# Patient Record
Sex: FEMALE | Race: OTHER | HISPANIC OR LATINO | URBAN - METROPOLITAN AREA
[De-identification: names, ages, dates, MRNs, and addresses within clinical notes are randomized per-mention and may not be internally consistent; named-entity substitution may affect disease eponyms.]

---

## 2021-11-29 ENCOUNTER — INPATIENT (INPATIENT)
Facility: HOSPITAL | Age: 61
LOS: 2 days | Discharge: HOME | End: 2021-12-02
Attending: HOSPITALIST | Admitting: HOSPITALIST
Payer: MEDICAID

## 2021-11-29 VITALS
DIASTOLIC BLOOD PRESSURE: 64 MMHG | RESPIRATION RATE: 20 BRPM | HEART RATE: 115 BPM | TEMPERATURE: 100 F | SYSTOLIC BLOOD PRESSURE: 132 MMHG | OXYGEN SATURATION: 100 %

## 2021-11-29 PROCEDURE — 93010 ELECTROCARDIOGRAM REPORT: CPT

## 2021-11-29 PROCEDURE — 99285 EMERGENCY DEPT VISIT HI MDM: CPT | Mod: 25

## 2021-11-30 DIAGNOSIS — J18.9 PNEUMONIA, UNSPECIFIED ORGANISM: ICD-10-CM

## 2021-11-30 DIAGNOSIS — A41.9 SEPSIS, UNSPECIFIED ORGANISM: ICD-10-CM

## 2021-11-30 DIAGNOSIS — Z29.9 ENCOUNTER FOR PROPHYLACTIC MEASURES, UNSPECIFIED: ICD-10-CM

## 2021-11-30 DIAGNOSIS — I10 ESSENTIAL (PRIMARY) HYPERTENSION: ICD-10-CM

## 2021-11-30 DIAGNOSIS — E87.6 HYPOKALEMIA: ICD-10-CM

## 2021-11-30 DIAGNOSIS — N39.0 URINARY TRACT INFECTION, SITE NOT SPECIFIED: ICD-10-CM

## 2021-11-30 DIAGNOSIS — E78.5 HYPERLIPIDEMIA, UNSPECIFIED: ICD-10-CM

## 2021-11-30 DIAGNOSIS — K21.9 GASTRO-ESOPHAGEAL REFLUX DISEASE WITHOUT ESOPHAGITIS: ICD-10-CM

## 2021-11-30 LAB
ALBUMIN SERPL ELPH-MCNC: 4.1 G/DL — SIGNIFICANT CHANGE UP (ref 3.3–5)
ALP SERPL-CCNC: 97 U/L — SIGNIFICANT CHANGE UP (ref 40–120)
ALT FLD-CCNC: 12 U/L — SIGNIFICANT CHANGE UP (ref 4–33)
ANION GAP SERPL CALC-SCNC: 17 MMOL/L — HIGH (ref 7–14)
APPEARANCE UR: ABNORMAL
AST SERPL-CCNC: 17 U/L — SIGNIFICANT CHANGE UP (ref 4–32)
BASOPHILS # BLD AUTO: 0 K/UL — SIGNIFICANT CHANGE UP (ref 0–0.2)
BASOPHILS NFR BLD AUTO: 0 % — SIGNIFICANT CHANGE UP (ref 0–2)
BILIRUB SERPL-MCNC: 1.8 MG/DL — HIGH (ref 0.2–1.2)
BILIRUB UR-MCNC: ABNORMAL
BLOOD GAS VENOUS COMPREHENSIVE RESULT: SIGNIFICANT CHANGE UP
BUN SERPL-MCNC: 14 MG/DL — SIGNIFICANT CHANGE UP (ref 7–23)
CALCIUM SERPL-MCNC: 9.1 MG/DL — SIGNIFICANT CHANGE UP (ref 8.4–10.5)
CHLORIDE SERPL-SCNC: 96 MMOL/L — LOW (ref 98–107)
CO2 SERPL-SCNC: 24 MMOL/L — SIGNIFICANT CHANGE UP (ref 22–31)
COLOR SPEC: ABNORMAL
CREAT SERPL-MCNC: 0.88 MG/DL — SIGNIFICANT CHANGE UP (ref 0.5–1.3)
DIFF PNL FLD: ABNORMAL
EOSINOPHIL # BLD AUTO: 0 K/UL — SIGNIFICANT CHANGE UP (ref 0–0.5)
EOSINOPHIL NFR BLD AUTO: 0 % — SIGNIFICANT CHANGE UP (ref 0–6)
FLUAV AG NPH QL: SIGNIFICANT CHANGE UP
FLUBV AG NPH QL: SIGNIFICANT CHANGE UP
GLUCOSE SERPL-MCNC: 103 MG/DL — HIGH (ref 70–99)
GLUCOSE UR QL: ABNORMAL
HCT VFR BLD CALC: 38.9 % — SIGNIFICANT CHANGE UP (ref 34.5–45)
HGB BLD-MCNC: 12.5 G/DL — SIGNIFICANT CHANGE UP (ref 11.5–15.5)
IANC: 19.7 K/UL — HIGH (ref 1.5–8.5)
KETONES UR-MCNC: ABNORMAL
LEGIONELLA AG UR QL: NEGATIVE — SIGNIFICANT CHANGE UP
LEUKOCYTE ESTERASE UR-ACNC: ABNORMAL
LYMPHOCYTES # BLD AUTO: 0.39 K/UL — LOW (ref 1–3.3)
LYMPHOCYTES # BLD AUTO: 1.7 % — LOW (ref 13–44)
MAGNESIUM SERPL-MCNC: 1.6 MG/DL — SIGNIFICANT CHANGE UP (ref 1.6–2.6)
MCHC RBC-ENTMCNC: 27.6 PG — SIGNIFICANT CHANGE UP (ref 27–34)
MCHC RBC-ENTMCNC: 32.1 GM/DL — SIGNIFICANT CHANGE UP (ref 32–36)
MCV RBC AUTO: 85.9 FL — SIGNIFICANT CHANGE UP (ref 80–100)
MONOCYTES # BLD AUTO: 1.58 K/UL — HIGH (ref 0–0.9)
MONOCYTES NFR BLD AUTO: 6.9 % — SIGNIFICANT CHANGE UP (ref 2–14)
NEUTROPHILS # BLD AUTO: 20.77 K/UL — HIGH (ref 1.8–7.4)
NEUTROPHILS NFR BLD AUTO: 87 % — HIGH (ref 43–77)
NITRITE UR-MCNC: NEGATIVE — SIGNIFICANT CHANGE UP
PH UR: 6 — SIGNIFICANT CHANGE UP (ref 5–8)
PHOSPHATE SERPL-MCNC: 2.3 MG/DL — LOW (ref 2.5–4.5)
PLATELET # BLD AUTO: 297 K/UL — SIGNIFICANT CHANGE UP (ref 150–400)
POTASSIUM SERPL-MCNC: 2.8 MMOL/L — CRITICAL LOW (ref 3.5–5.3)
POTASSIUM SERPL-SCNC: 2.8 MMOL/L — CRITICAL LOW (ref 3.5–5.3)
PROCALCITONIN SERPL-MCNC: 1.19 NG/ML — HIGH (ref 0.02–0.1)
PROT SERPL-MCNC: 7.7 G/DL — SIGNIFICANT CHANGE UP (ref 6–8.3)
PROT UR-MCNC: ABNORMAL
RBC # BLD: 4.53 M/UL — SIGNIFICANT CHANGE UP (ref 3.8–5.2)
RBC # FLD: 14 % — SIGNIFICANT CHANGE UP (ref 10.3–14.5)
RSV RNA NPH QL NAA+NON-PROBE: SIGNIFICANT CHANGE UP
SARS-COV-2 RNA SPEC QL NAA+PROBE: SIGNIFICANT CHANGE UP
SODIUM SERPL-SCNC: 137 MMOL/L — SIGNIFICANT CHANGE UP (ref 135–145)
SP GR SPEC: 1.03 — SIGNIFICANT CHANGE UP (ref 1–1.05)
TROPONIN T, HIGH SENSITIVITY RESULT: 17 NG/L — SIGNIFICANT CHANGE UP
UROBILINOGEN FLD QL: ABNORMAL
WBC # BLD: 22.95 K/UL — HIGH (ref 3.8–10.5)
WBC # FLD AUTO: 22.95 K/UL — HIGH (ref 3.8–10.5)

## 2021-11-30 PROCEDURE — 71045 X-RAY EXAM CHEST 1 VIEW: CPT | Mod: 26,59

## 2021-11-30 PROCEDURE — 99223 1ST HOSP IP/OBS HIGH 75: CPT

## 2021-11-30 PROCEDURE — 71046 X-RAY EXAM CHEST 2 VIEWS: CPT | Mod: 26

## 2021-11-30 RX ORDER — ENOXAPARIN SODIUM 100 MG/ML
40 INJECTION SUBCUTANEOUS EVERY 24 HOURS
Refills: 0 | Status: DISCONTINUED | OUTPATIENT
Start: 2021-11-30 | End: 2021-12-02

## 2021-11-30 RX ORDER — SODIUM CHLORIDE 9 MG/ML
2000 INJECTION INTRAMUSCULAR; INTRAVENOUS; SUBCUTANEOUS ONCE
Refills: 0 | Status: COMPLETED | OUTPATIENT
Start: 2021-11-30 | End: 2021-11-30

## 2021-11-30 RX ORDER — MAGNESIUM SULFATE 500 MG/ML
2 VIAL (ML) INJECTION ONCE
Refills: 0 | Status: COMPLETED | OUTPATIENT
Start: 2021-11-30 | End: 2021-11-30

## 2021-11-30 RX ORDER — CEFTRIAXONE 500 MG/1
1000 INJECTION, POWDER, FOR SOLUTION INTRAMUSCULAR; INTRAVENOUS EVERY 24 HOURS
Refills: 0 | Status: DISCONTINUED | OUTPATIENT
Start: 2021-12-01 | End: 2021-12-02

## 2021-11-30 RX ORDER — AMLODIPINE BESYLATE 2.5 MG/1
10 TABLET ORAL DAILY
Refills: 0 | Status: DISCONTINUED | OUTPATIENT
Start: 2021-11-30 | End: 2021-12-02

## 2021-11-30 RX ORDER — AMLODIPINE BESYLATE 2.5 MG/1
1 TABLET ORAL
Qty: 0 | Refills: 0 | DISCHARGE

## 2021-11-30 RX ORDER — CEFTRIAXONE 500 MG/1
1000 INJECTION, POWDER, FOR SOLUTION INTRAMUSCULAR; INTRAVENOUS ONCE
Refills: 0 | Status: COMPLETED | OUTPATIENT
Start: 2021-11-30 | End: 2021-11-30

## 2021-11-30 RX ORDER — AZITHROMYCIN 500 MG/1
500 TABLET, FILM COATED ORAL EVERY 24 HOURS
Refills: 0 | Status: DISCONTINUED | OUTPATIENT
Start: 2021-12-01 | End: 2021-12-02

## 2021-11-30 RX ORDER — AZITHROMYCIN 500 MG/1
500 TABLET, FILM COATED ORAL ONCE
Refills: 0 | Status: COMPLETED | OUTPATIENT
Start: 2021-11-30 | End: 2021-11-30

## 2021-11-30 RX ORDER — SODIUM CHLORIDE 9 MG/ML
1000 INJECTION INTRAMUSCULAR; INTRAVENOUS; SUBCUTANEOUS ONCE
Refills: 0 | Status: COMPLETED | OUTPATIENT
Start: 2021-11-30 | End: 2021-11-30

## 2021-11-30 RX ORDER — POTASSIUM CHLORIDE 20 MEQ
40 PACKET (EA) ORAL ONCE
Refills: 0 | Status: COMPLETED | OUTPATIENT
Start: 2021-11-30 | End: 2021-11-30

## 2021-11-30 RX ORDER — ATORVASTATIN CALCIUM 80 MG/1
1 TABLET, FILM COATED ORAL
Qty: 0 | Refills: 0 | DISCHARGE

## 2021-11-30 RX ORDER — ASPIRIN/CALCIUM CARB/MAGNESIUM 324 MG
1 TABLET ORAL
Qty: 0 | Refills: 0 | DISCHARGE

## 2021-11-30 RX ORDER — PANTOPRAZOLE SODIUM 20 MG/1
40 TABLET, DELAYED RELEASE ORAL
Refills: 0 | Status: DISCONTINUED | OUTPATIENT
Start: 2021-11-30 | End: 2021-12-02

## 2021-11-30 RX ORDER — POTASSIUM CHLORIDE 20 MEQ
10 PACKET (EA) ORAL
Refills: 0 | Status: COMPLETED | OUTPATIENT
Start: 2021-11-30 | End: 2021-11-30

## 2021-11-30 RX ORDER — ACETAMINOPHEN 500 MG
650 TABLET ORAL EVERY 6 HOURS
Refills: 0 | Status: DISCONTINUED | OUTPATIENT
Start: 2021-11-30 | End: 2021-12-02

## 2021-11-30 RX ORDER — ESOMEPRAZOLE MAGNESIUM 40 MG/1
1 CAPSULE, DELAYED RELEASE ORAL
Qty: 0 | Refills: 0 | DISCHARGE

## 2021-11-30 RX ORDER — IPRATROPIUM/ALBUTEROL SULFATE 18-103MCG
3 AEROSOL WITH ADAPTER (GRAM) INHALATION EVERY 6 HOURS
Refills: 0 | Status: DISCONTINUED | OUTPATIENT
Start: 2021-11-30 | End: 2021-12-02

## 2021-11-30 RX ORDER — ATORVASTATIN CALCIUM 80 MG/1
40 TABLET, FILM COATED ORAL AT BEDTIME
Refills: 0 | Status: DISCONTINUED | OUTPATIENT
Start: 2021-11-30 | End: 2021-12-02

## 2021-11-30 RX ORDER — ACETAMINOPHEN 500 MG
975 TABLET ORAL ONCE
Refills: 0 | Status: COMPLETED | OUTPATIENT
Start: 2021-11-30 | End: 2021-11-30

## 2021-11-30 RX ORDER — ASPIRIN/CALCIUM CARB/MAGNESIUM 324 MG
81 TABLET ORAL DAILY
Refills: 0 | Status: DISCONTINUED | OUTPATIENT
Start: 2021-11-30 | End: 2021-12-02

## 2021-11-30 RX ADMIN — Medication 100 MILLIEQUIVALENT(S): at 06:35

## 2021-11-30 RX ADMIN — Medication 50 GRAM(S): at 06:35

## 2021-11-30 RX ADMIN — ENOXAPARIN SODIUM 40 MILLIGRAM(S): 100 INJECTION SUBCUTANEOUS at 21:17

## 2021-11-30 RX ADMIN — SODIUM CHLORIDE 1000 MILLILITER(S): 9 INJECTION INTRAMUSCULAR; INTRAVENOUS; SUBCUTANEOUS at 05:22

## 2021-11-30 RX ADMIN — SODIUM CHLORIDE 2000 MILLILITER(S): 9 INJECTION INTRAMUSCULAR; INTRAVENOUS; SUBCUTANEOUS at 04:41

## 2021-11-30 RX ADMIN — Medication 975 MILLIGRAM(S): at 02:56

## 2021-11-30 RX ADMIN — Medication 100 MILLIEQUIVALENT(S): at 08:28

## 2021-11-30 RX ADMIN — SODIUM CHLORIDE 2000 MILLILITER(S): 9 INJECTION INTRAMUSCULAR; INTRAVENOUS; SUBCUTANEOUS at 02:55

## 2021-11-30 RX ADMIN — ATORVASTATIN CALCIUM 40 MILLIGRAM(S): 80 TABLET, FILM COATED ORAL at 21:17

## 2021-11-30 RX ADMIN — Medication 975 MILLIGRAM(S): at 05:30

## 2021-11-30 RX ADMIN — Medication 3 MILLILITER(S): at 22:57

## 2021-11-30 RX ADMIN — CEFTRIAXONE 100 MILLIGRAM(S): 500 INJECTION, POWDER, FOR SOLUTION INTRAMUSCULAR; INTRAVENOUS at 04:41

## 2021-11-30 RX ADMIN — Medication 63.75 MILLIMOLE(S): at 08:11

## 2021-11-30 RX ADMIN — Medication 81 MILLIGRAM(S): at 14:48

## 2021-11-30 RX ADMIN — AZITHROMYCIN 500 MILLIGRAM(S): 500 TABLET, FILM COATED ORAL at 06:35

## 2021-11-30 RX ADMIN — Medication 100 MILLIEQUIVALENT(S): at 05:23

## 2021-11-30 RX ADMIN — AZITHROMYCIN 255 MILLIGRAM(S): 500 TABLET, FILM COATED ORAL at 04:56

## 2021-11-30 RX ADMIN — CEFTRIAXONE 1000 MILLIGRAM(S): 500 INJECTION, POWDER, FOR SOLUTION INTRAMUSCULAR; INTRAVENOUS at 05:23

## 2021-11-30 RX ADMIN — Medication 40 MILLIEQUIVALENT(S): at 04:43

## 2021-11-30 NOTE — ED PROVIDER NOTE - NSICDXPASTMEDICALHX_GEN_ALL_CORE_FT
PAST MEDICAL HISTORY:  GERD (gastroesophageal reflux disease)     Hyperlipidemia     Hypertension

## 2021-11-30 NOTE — H&P ADULT - PROBLEM SELECTOR PLAN 2
Cough, fever. Respiratory status stable. Crackles diffusely on lung exam. CXR with LLL PNA.   - C/w ceftriaxone and azithromycin to treat for CAP

## 2021-11-30 NOTE — ED PROVIDER NOTE - OBJECTIVE STATEMENT
62 y/o F with h/o HTN, hyperlipidemia, GERD here with 1 day fever, myalgias, cough.  Pt reports she was recently visiting her family in [place] and her granddaughter has similar symptoms.  No cp, sob, abd pain.  (+)nausea, (+)"loose stool"  Pt is vaccinated against covid-19 (moderna, last dose 9/30/21); has not received flu vaccine this year. 62 y/o F with h/o HTN, hyperlipidemia, GERD here with 1 day fever, myalgias, cough.  Pt reports she was recently visiting her family in [place] and her granddaughter has similar symptoms.  No cp, sob, abd pain.  (+)nausea, (+)"loose stool"  Pt is vaccinated against covid-19 (moderna, last dose 9/30/21); has not received flu vaccine this year.    PMD Dr Dewayne Navarro

## 2021-11-30 NOTE — H&P ADULT - NSHPPHYSICALEXAM_GEN_ALL_CORE
.  VITAL SIGNS:  T(C): 36.6 (11-30-21 @ 08:36), Max: 39.1 (11-30-21 @ 01:38)  T(F): 97.8 (11-30-21 @ 08:36), Max: 102.4 (11-30-21 @ 01:38)  HR: 82 (11-30-21 @ 08:36) (82 - 115)  BP: 132/51 (11-30-21 @ 08:36) (116/49 - 132/64)  BP(mean): --  RR: 20 (11-30-21 @ 08:36) (17 - 21)  SpO2: 95% (11-30-21 @ 08:36) (95% - 100%)  Wt(kg): --    PHYSICAL EXAM:    Constitutional: WDWN resting comfortably in bed; NAD  Head: NC/AT  Eyes: PERRL, EOMI, anicteric sclera  ENT: no nasal discharge; uvula midline, no oropharyngeal erythema or exudates; MMM  Neck: supple; no JVD or thyromegaly  Respiratory: Diffuse crackles throughout all lung fields, no wheezing/rhonchi. No respiratory distress, not tachypneic.   Cardiac: +S1/S2; RRR; no M/R/G; PMI non-displaced  Gastrointestinal: abdomen soft, NT/ND; no rebound or guarding; +BSx4  Back: spine midline, no bony tenderness or step-offs; Left sided CVA tenderness  Extremities: WWP, no clubbing or cyanosis; no peripheral edema  Musculoskeletal: NROM x4; no joint swelling, tenderness or erythema  Vascular: 2+ radial, femoral, DP/PT pulses B/L  Dermatologic: skin warm, dry and intact; no rashes, wounds, or scars  Lymphatic: no submandibular or cervical LAD  Neurologic: AAOx3; CNII-XII grossly intact; no focal deficits  Psychiatric: affect and characteristics of appearance, verbalizations, behaviors are appropriate

## 2021-11-30 NOTE — ED ADULT NURSE REASSESSMENT NOTE - NS ED NURSE REASSESS COMMENT FT1
pt at baseline mental status, appears in NAD. Denies any complaints. RR even and unlabored. Report given to RN Heydi on receiving floor. Awaiting transport. Will continue to monitor.

## 2021-11-30 NOTE — H&P ADULT - NSHPLABSRESULTS_GEN_ALL_CORE
.  LABS:                         12.5   22.95 )-----------( 297      ( 2021 03:39 )             38.9         137  |  96<L>  |  14  ----------------------------<  103<H>  2.8<LL>   |  24  |  0.88    Ca    9.1      2021 03:39  Phos  2.3       Mg     1.60         TPro  7.7  /  Alb  4.1  /  TBili  1.8<H>  /  DBili  x   /  AST  17  /  ALT  12  /  AlkPhos  97        Urinalysis Basic - ( 2021 03:39 )    Color: Nannette / Appearance: Slightly Turbid / S.029 / pH: x  Gluc: x / Ketone: Small  / Bili: Small / Urobili: 6 mg/dL   Blood: x / Protein: 100 mg/dL / Nitrite: Negative   Leuk Esterase: Moderate / RBC: <5 /HPF / WBC >10 /HPF   Sq Epi: x / Non Sq Epi: 12 /HPF / Bacteria: Few                RADIOLOGY, EKG & ADDITIONAL TESTS: Reviewed.

## 2021-11-30 NOTE — H&P ADULT - HISTORY OF PRESENT ILLNESS
60 y/o F with PMH HTN, HLD, and GERD who presents after having a fever, cough and back pain at home. She reports that her daughter was sick and she started to feel unwell after Thanksgiving. She was experiencing fever, cough, SOB, chest pain with coughing, nausea/vomiting (5 episodes), and one episode of watery diarrhea. She reports that she has been urinating more frequently and has some pain in her lower abdomen as well as mild back pain. No dysuria. She reports her urine is malodorous. She is now feeling better after coming to the emergency room.     In the ED, VS T 102.4, RR 21, SpO2 95% on RA, HR 92. Labs significant for leukocytosis. UA positive. CXR with LLL PNA. She was given ceftriaxone and azithromycin. Admitted to medicine for further management.

## 2021-11-30 NOTE — H&P ADULT - ASSESSMENT
60 y/o F with PMH HTN, HLD, and GERD who presents after having a fever, cough and back pain at home found to be septic 2/2 UTI and PNA.

## 2021-11-30 NOTE — H&P ADULT - NSHPREVIEWOFSYSTEMS_GEN_ALL_CORE
REVIEW OF SYSTEMS:    CONSTITUTIONAL: (+) weakness, fevers or chills  EYES/ENT: No visual changes;  No vertigo or throat pain   NECK: No pain or stiffness  RESPIRATORY: (+) cough - no wheezing, hemoptysis; No shortness of breath  CARDIOVASCULAR: No chest pain or palpitations  GASTROINTESTINAL: (+) abdominal pain. (+) nausea/vomiting. No hematemesis.; (+) diarrhea, no constipation.   GENITOURINARY: No dysuria, frequency or hematuria  NEUROLOGICAL: No numbness or weakness  SKIN: No itching, rashes

## 2021-11-30 NOTE — H&P ADULT - PROBLEM SELECTOR PLAN 3
Likely pyelonephritis given presentation of nausea/vomiting, increased urinary frequency and back pain  as well as physical exam findings (CVA tenderness/suprapubic tenderness).   - C/w ceftriaxone for now   - F/u UCx   - If no improvement in fever/symptoms - would obtain CT scan to assess for source control.

## 2021-11-30 NOTE — ED PROVIDER NOTE - CLINICAL SUMMARY MEDICAL DECISION MAKING FREE TEXT BOX
62 y/o F with h/o hyperlipdemia, HTN, GERD with 1 day fever, myalgias, cough.  Pt is febrile and tachycardic here, while meets SIRS criteria for sepsis will send cultures, labs, cxr, ua, hold abx at this time given high suspicion for viral etiology.  IVFs, antipyretics, reassess.

## 2021-11-30 NOTE — ED ADULT NURSE REASSESSMENT NOTE - NS ED NURSE REASSESS COMMENT FT1
received report from overnight RN. Pt axox4, resting with RR even and unlabored. No accessory muscle use noted. VSS. Pt receiving K runs as per MD orders. NSR on monitor. Pt denies CP, palpitations. pt verbalizing improvement in symptoms since arriving to ED. Awaiting bed assignment. Will continue to monitor.

## 2021-11-30 NOTE — H&P ADULT - NSHPSOCIALHISTORY_GEN_ALL_CORE
General Sunscreen Counseling: I recommended a broad spectrum sunscreen with a SPF of 45 or higher.  I explained that SPF 45 sunscreens block approximately 97 percent of the sun's harmful rays.  Sunscreens should be applied at least 15 minutes prior to expected sun exposure and then every 2 hours after that as long as sun exposure continues. If swimming or exercising sunscreen should be reapplied every 45 minutes to an hour after getting wet or sweating.  One ounce, or the equivalent of a shot glass full of sunscreen, is adequate to protect the skin not covered by a bathing suit. I also recommended a lip balm with a sunscreen as well. Sun protective clothing can be used in lieu of sunscreen but must be worn the entire time you are exposed to the sun's rays. Detail Level: Detailed Denies EtOH, tobacco use

## 2021-11-30 NOTE — ED ADULT NURSE NOTE - OBJECTIVE STATEMENT
patient aaox4. ambulatory. came in with flu like symptoms. patient reports symptoms started about a week ago. went to see family in [place] and felt worse afterwards. granddaughter was sick with unkown illness, flu-like symptoms. patient reports nasal congestions, subjective fevers and chills at home, shortness of breath, n/v x5 episodes today, dizziness, and headache 10/10. patient is covid vaccinated. did not receive booster shot or flu vaccine. iv start to left and right ac #18g. labs drawn and sent. medicated as ordered. blood cultures sent. flu and covid swab sent. made comfortable. Will continue to monitor

## 2021-11-30 NOTE — H&P ADULT - PROBLEM SELECTOR PLAN 1
T 102.4 with leukocytosis of 22 2/2 pyelonephritis and LLL PNA. UA positive and pt reporting flank pain, CVA tenderness (+) on exam. LLL consolidation detected. COVID negative. S/p 3L NS + cef/azithro in the ED.   - C/w ceftriaxone and azithromycin for now   - Appropriately fluid resuscitated, encourage PO intale   - F/u BCx and UCx   - F/u urine legionella   - Tylenol PRN fever

## 2021-12-01 LAB
ANION GAP SERPL CALC-SCNC: 11 MMOL/L — SIGNIFICANT CHANGE UP (ref 7–14)
ANION GAP SERPL CALC-SCNC: 11 MMOL/L — SIGNIFICANT CHANGE UP (ref 7–14)
BUN SERPL-MCNC: 10 MG/DL — SIGNIFICANT CHANGE UP (ref 7–23)
BUN SERPL-MCNC: 12 MG/DL — SIGNIFICANT CHANGE UP (ref 7–23)
CALCIUM SERPL-MCNC: 8.5 MG/DL — SIGNIFICANT CHANGE UP (ref 8.4–10.5)
CALCIUM SERPL-MCNC: 8.6 MG/DL — SIGNIFICANT CHANGE UP (ref 8.4–10.5)
CHLORIDE SERPL-SCNC: 104 MMOL/L — SIGNIFICANT CHANGE UP (ref 98–107)
CHLORIDE SERPL-SCNC: 105 MMOL/L — SIGNIFICANT CHANGE UP (ref 98–107)
CO2 SERPL-SCNC: 22 MMOL/L — SIGNIFICANT CHANGE UP (ref 22–31)
CO2 SERPL-SCNC: 24 MMOL/L — SIGNIFICANT CHANGE UP (ref 22–31)
CREAT SERPL-MCNC: 0.64 MG/DL — SIGNIFICANT CHANGE UP (ref 0.5–1.3)
CREAT SERPL-MCNC: 0.78 MG/DL — SIGNIFICANT CHANGE UP (ref 0.5–1.3)
CULTURE RESULTS: SIGNIFICANT CHANGE UP
GLUCOSE SERPL-MCNC: 102 MG/DL — HIGH (ref 70–99)
GLUCOSE SERPL-MCNC: 113 MG/DL — HIGH (ref 70–99)
HCT VFR BLD CALC: 35.5 % — SIGNIFICANT CHANGE UP (ref 34.5–45)
HCV AB S/CO SERPL IA: 0.17 S/CO — SIGNIFICANT CHANGE UP (ref 0–0.99)
HCV AB SERPL-IMP: SIGNIFICANT CHANGE UP
HGB BLD-MCNC: 11.7 G/DL — SIGNIFICANT CHANGE UP (ref 11.5–15.5)
MAGNESIUM SERPL-MCNC: 2.1 MG/DL — SIGNIFICANT CHANGE UP (ref 1.6–2.6)
MAGNESIUM SERPL-MCNC: 2.1 MG/DL — SIGNIFICANT CHANGE UP (ref 1.6–2.6)
MCHC RBC-ENTMCNC: 28.5 PG — SIGNIFICANT CHANGE UP (ref 27–34)
MCHC RBC-ENTMCNC: 33 GM/DL — SIGNIFICANT CHANGE UP (ref 32–36)
MCV RBC AUTO: 86.4 FL — SIGNIFICANT CHANGE UP (ref 80–100)
NRBC # BLD: 0 /100 WBCS — SIGNIFICANT CHANGE UP
NRBC # FLD: 0 K/UL — SIGNIFICANT CHANGE UP
PHOSPHATE SERPL-MCNC: 1.6 MG/DL — LOW (ref 2.5–4.5)
PHOSPHATE SERPL-MCNC: 2.5 MG/DL — SIGNIFICANT CHANGE UP (ref 2.5–4.5)
PLATELET # BLD AUTO: 304 K/UL — SIGNIFICANT CHANGE UP (ref 150–400)
POTASSIUM SERPL-MCNC: 3.1 MMOL/L — LOW (ref 3.5–5.3)
POTASSIUM SERPL-MCNC: 3.6 MMOL/L — SIGNIFICANT CHANGE UP (ref 3.5–5.3)
POTASSIUM SERPL-SCNC: 3.1 MMOL/L — LOW (ref 3.5–5.3)
POTASSIUM SERPL-SCNC: 3.6 MMOL/L — SIGNIFICANT CHANGE UP (ref 3.5–5.3)
RBC # BLD: 4.11 M/UL — SIGNIFICANT CHANGE UP (ref 3.8–5.2)
RBC # FLD: 14.3 % — SIGNIFICANT CHANGE UP (ref 10.3–14.5)
SODIUM SERPL-SCNC: 137 MMOL/L — SIGNIFICANT CHANGE UP (ref 135–145)
SODIUM SERPL-SCNC: 140 MMOL/L — SIGNIFICANT CHANGE UP (ref 135–145)
SPECIMEN SOURCE: SIGNIFICANT CHANGE UP
WBC # BLD: 20.71 K/UL — HIGH (ref 3.8–10.5)
WBC # FLD AUTO: 20.71 K/UL — HIGH (ref 3.8–10.5)

## 2021-12-01 PROCEDURE — 99233 SBSQ HOSP IP/OBS HIGH 50: CPT

## 2021-12-01 RX ORDER — POTASSIUM CHLORIDE 20 MEQ
40 PACKET (EA) ORAL ONCE
Refills: 0 | Status: COMPLETED | OUTPATIENT
Start: 2021-12-01 | End: 2021-12-01

## 2021-12-01 RX ORDER — POTASSIUM PHOSPHATE, MONOBASIC POTASSIUM PHOSPHATE, DIBASIC 236; 224 MG/ML; MG/ML
30 INJECTION, SOLUTION INTRAVENOUS ONCE
Refills: 0 | Status: COMPLETED | OUTPATIENT
Start: 2021-12-01 | End: 2021-12-01

## 2021-12-01 RX ORDER — INFLUENZA VIRUS VACCINE 15; 15; 15; 15 UG/.5ML; UG/.5ML; UG/.5ML; UG/.5ML
0.5 SUSPENSION INTRAMUSCULAR ONCE
Refills: 0 | Status: DISCONTINUED | OUTPATIENT
Start: 2021-12-01 | End: 2021-12-02

## 2021-12-01 RX ADMIN — Medication 3 MILLILITER(S): at 04:41

## 2021-12-01 RX ADMIN — AZITHROMYCIN 255 MILLIGRAM(S): 500 TABLET, FILM COATED ORAL at 05:33

## 2021-12-01 RX ADMIN — CEFTRIAXONE 100 MILLIGRAM(S): 500 INJECTION, POWDER, FOR SOLUTION INTRAMUSCULAR; INTRAVENOUS at 04:27

## 2021-12-01 RX ADMIN — Medication 3 MILLILITER(S): at 16:00

## 2021-12-01 RX ADMIN — PANTOPRAZOLE SODIUM 40 MILLIGRAM(S): 20 TABLET, DELAYED RELEASE ORAL at 06:39

## 2021-12-01 RX ADMIN — ENOXAPARIN SODIUM 40 MILLIGRAM(S): 100 INJECTION SUBCUTANEOUS at 21:36

## 2021-12-01 RX ADMIN — POTASSIUM PHOSPHATE, MONOBASIC POTASSIUM PHOSPHATE, DIBASIC 83.33 MILLIMOLE(S): 236; 224 INJECTION, SOLUTION INTRAVENOUS at 02:22

## 2021-12-01 RX ADMIN — Medication 81 MILLIGRAM(S): at 11:03

## 2021-12-01 RX ADMIN — Medication 40 MILLIEQUIVALENT(S): at 01:59

## 2021-12-01 RX ADMIN — AMLODIPINE BESYLATE 10 MILLIGRAM(S): 2.5 TABLET ORAL at 05:34

## 2021-12-01 RX ADMIN — ATORVASTATIN CALCIUM 40 MILLIGRAM(S): 80 TABLET, FILM COATED ORAL at 21:35

## 2021-12-01 RX ADMIN — Medication 3 MILLILITER(S): at 23:56

## 2021-12-01 RX ADMIN — Medication 3 MILLILITER(S): at 09:19

## 2021-12-01 NOTE — PATIENT PROFILE ADULT - FALL HARM RISK - UNIVERSAL INTERVENTIONS
Bed in lowest position, wheels locked, appropriate side rails in place/Call bell, personal items and telephone in reach/Instruct patient to call for assistance before getting out of bed or chair/Non-slip footwear when patient is out of bed/[place] to call system/Physically safe environment - no spills, clutter or unnecessary equipment/Purposeful Proactive Rounding/Room/bathroom lighting operational, light cord in reach

## 2021-12-01 NOTE — PATIENT PROFILE ADULT - NSPROHMSYMPCOND_GEN_A_NUR
CM called pt's pharmacy to check coverage of lovenox. Per pharmacy tech, prescription is covered with a $40 copay. Stated has partial in stock today and will have rest in stock tomorrow.        [facility] - [place], [place] - [address]  [address] [address]  Phone: [phone] Fax: [phone]       none

## 2021-12-01 NOTE — PATIENT PROFILE ADULT - FALL HARM RISK - FALLEN IN PAST
----- Message from Thomas Morrisonma sent at 6/26/2017  7:17 AM CDT -----  Hemoglobin A1c in diabetic range; please refer patient to diabetes clinic, Vasyl Brar or Dr Corina Costello.  Kidney function normal. One liver enzyme elevated; pt has referral to see No

## 2021-12-02 ENCOUNTER — TRANSCRIPTION ENCOUNTER (OUTPATIENT)
Age: 61
End: 2021-12-02

## 2021-12-02 VITALS — OXYGEN SATURATION: 99 %

## 2021-12-02 LAB
ANION GAP SERPL CALC-SCNC: 11 MMOL/L — SIGNIFICANT CHANGE UP (ref 7–14)
BUN SERPL-MCNC: 8 MG/DL — SIGNIFICANT CHANGE UP (ref 7–23)
CALCIUM SERPL-MCNC: 8.6 MG/DL — SIGNIFICANT CHANGE UP (ref 8.4–10.5)
CHLORIDE SERPL-SCNC: 99 MMOL/L — SIGNIFICANT CHANGE UP (ref 98–107)
CO2 SERPL-SCNC: 24 MMOL/L — SIGNIFICANT CHANGE UP (ref 22–31)
CREAT SERPL-MCNC: 0.6 MG/DL — SIGNIFICANT CHANGE UP (ref 0.5–1.3)
GLUCOSE SERPL-MCNC: 138 MG/DL — HIGH (ref 70–99)
HCT VFR BLD CALC: 34.7 % — SIGNIFICANT CHANGE UP (ref 34.5–45)
HGB BLD-MCNC: 11.3 G/DL — LOW (ref 11.5–15.5)
MAGNESIUM SERPL-MCNC: 2 MG/DL — SIGNIFICANT CHANGE UP (ref 1.6–2.6)
MCHC RBC-ENTMCNC: 28 PG — SIGNIFICANT CHANGE UP (ref 27–34)
MCHC RBC-ENTMCNC: 32.6 GM/DL — SIGNIFICANT CHANGE UP (ref 32–36)
MCV RBC AUTO: 85.9 FL — SIGNIFICANT CHANGE UP (ref 80–100)
NRBC # BLD: 0 /100 WBCS — SIGNIFICANT CHANGE UP
NRBC # FLD: 0 K/UL — SIGNIFICANT CHANGE UP
PHOSPHATE SERPL-MCNC: 2.5 MG/DL — SIGNIFICANT CHANGE UP (ref 2.5–4.5)
PLATELET # BLD AUTO: 309 K/UL — SIGNIFICANT CHANGE UP (ref 150–400)
POTASSIUM SERPL-MCNC: 3.2 MMOL/L — LOW (ref 3.5–5.3)
POTASSIUM SERPL-SCNC: 3.2 MMOL/L — LOW (ref 3.5–5.3)
RBC # BLD: 4.04 M/UL — SIGNIFICANT CHANGE UP (ref 3.8–5.2)
RBC # FLD: 14.3 % — SIGNIFICANT CHANGE UP (ref 10.3–14.5)
SODIUM SERPL-SCNC: 134 MMOL/L — LOW (ref 135–145)
WBC # BLD: 15.55 K/UL — HIGH (ref 3.8–10.5)
WBC # FLD AUTO: 15.55 K/UL — HIGH (ref 3.8–10.5)

## 2021-12-02 PROCEDURE — 99239 HOSP IP/OBS DSCHRG MGMT >30: CPT

## 2021-12-02 RX ORDER — ACETAMINOPHEN 500 MG
2 TABLET ORAL
Qty: 0 | Refills: 0 | DISCHARGE
Start: 2021-12-02

## 2021-12-02 RX ORDER — CIPROFLOXACIN LACTATE 400MG/40ML
1 VIAL (ML) INTRAVENOUS
Qty: 5 | Refills: 0
Start: 2021-12-02 | End: 2021-12-06

## 2021-12-02 RX ADMIN — PANTOPRAZOLE SODIUM 40 MILLIGRAM(S): 20 TABLET, DELAYED RELEASE ORAL at 06:59

## 2021-12-02 RX ADMIN — AZITHROMYCIN 255 MILLIGRAM(S): 500 TABLET, FILM COATED ORAL at 05:40

## 2021-12-02 RX ADMIN — CEFTRIAXONE 100 MILLIGRAM(S): 500 INJECTION, POWDER, FOR SOLUTION INTRAMUSCULAR; INTRAVENOUS at 04:47

## 2021-12-02 RX ADMIN — Medication 3 MILLILITER(S): at 15:09

## 2021-12-02 RX ADMIN — Medication 650 MILLIGRAM(S): at 06:22

## 2021-12-02 RX ADMIN — Medication 3 MILLILITER(S): at 10:52

## 2021-12-02 RX ADMIN — Medication 81 MILLIGRAM(S): at 12:26

## 2021-12-02 RX ADMIN — AMLODIPINE BESYLATE 10 MILLIGRAM(S): 2.5 TABLET ORAL at 05:40

## 2021-12-02 RX ADMIN — Medication 650 MILLIGRAM(S): at 05:46

## 2021-12-02 NOTE — PROGRESS NOTE ADULT - PROBLEM SELECTOR PLAN 3
Likely pyelonephritis given presentation of nausea/vomiting, increased urinary frequency and back pain  as well as physical exam findings (CVA tenderness/suprapubic tenderness).   - C/w ceftriaxone for now   - F/u UCx, blood Cxs
UCx negative, symptoms resolved   will d/c on Levaquin to cover CAP

## 2021-12-02 NOTE — DISCHARGE NOTE NURSING/CASE MANAGEMENT/SOCIAL WORK - PATIENT PORTAL LINK FT
You can access the FollowMyHealth Patient Portal offered by [facility] by registering at the following website: [email or web address]. By joining [facility]’s FollowMyHealth portal, you will also be able to view your health information using other applications (apps) compatible with our system.

## 2021-12-02 NOTE — DISCHARGE NOTE PROVIDER - NSDCCPCAREPLAN_GEN_ALL_CORE_FT
PRINCIPAL DISCHARGE DIAGNOSIS  Diagnosis: Pneumonia  Assessment and Plan of Treatment: Lower lobe Pneumonia.  Continue Levaquin and complete antibioitcs for a total of 7 days.  Follow up with your PCP within 1 week of discharge for further medical management.         SECONDARY DISCHARGE DIAGNOSES  Diagnosis: Hypokalemia  Assessment and Plan of Treatment: Your potassium level has norm alized since admission.    Diagnosis: HTN (hypertension)  Assessment and Plan of Treatment: Continue current blood pressure medication regimen as directed. Monitor for any visual changes, headaches or dizziness.  Monitor blood pressure regularly.  Follow up with your PCP for further management for high blood pressure, please call to make appointment within 1 week of discharge    Diagnosis: HLD (hyperlipidemia)  Assessment and Plan of Treatment: Continue medication as prescribed.    Diagnosis: GERD (gastroesophageal reflux disease)  Assessment and Plan of Treatment: Continue Nexium as prescribed.

## 2021-12-02 NOTE — DISCHARGE NOTE NURSING/CASE MANAGEMENT/SOCIAL WORK - NSDCPEFALRISK_GEN_ALL_CORE
For information on Fall & Injury Prevention, visit: [email or web address] OR  [email or web address] OR  https://www.cdc.gov/steadi/patient.html

## 2021-12-02 NOTE — DISCHARGE NOTE PROVIDER - NSDCMRMEDTOKEN_GEN_ALL_CORE_FT
acetaminophen 325 mg oral tablet: 2 tab(s) orally every 6 hours, As needed, Temp greater or equal to 38C (100.4F), Mild Pain (1 - 3)  aspirin 81 mg oral delayed release tablet: 1 tab(s) orally once a day  atorvastatin 40 mg oral tablet: 1 tab(s) orally once a day  levoFLOXacin 750 mg oral tablet: 1 tab(s) orally once a day   NexIUM 24HR 20 mg oral delayed release capsule: 1 cap(s) orally once a day  Norvasc 10 mg oral tablet: 1 tab(s) orally once a day

## 2021-12-02 NOTE — PROGRESS NOTE ADULT - ASSESSMENT
62 y/o F with PMH HTN, HLD, and GERD who presents after having a fever, cough and back pain at home found to be septic 2/2 UTI and PNA. 
60 y/o F with PMH HTN, HLD, and GERD who presents after having a fever, cough and back pain at home found to be septic 2/2 UTI and PNA.

## 2021-12-02 NOTE — PROGRESS NOTE ADULT - PROBLEM SELECTOR PLAN 4
replete  should improve w/ resumption of appetite
K 2.8. Likely 2/2 vomiting.   - replete PRN     #Hypophosphatemia  - replete PRN

## 2021-12-02 NOTE — PROGRESS NOTE ADULT - PROBLEM SELECTOR PLAN 7
C/w Nexium - therapeutic interchange pantoprazole 40mg qd
C/w Nexium - therapeutic interchange pantoprazole 40mg qd

## 2021-12-02 NOTE — DISCHARGE NOTE PROVIDER - HOSPITAL COURSE
62 y/o F with PMH HTN, HLD, and GERD who presents after having a fever, cough and back pain at home. She reports that her daughter was sick and she started to feel unwell after Thanksgiving. She was experiencing fever, cough, SOB, chest pain with coughing, nausea/vomiting (5 episodes), and one episode of watery diarrhea. She reports that she has been urinating more frequently and has some pain in her lower abdomen as well as mild back pain. No dysuria.     Hospital Course:    ·  Problem: Sepsis.   ·  Plan: T 102.4 with leukocytosis of 22 2/2 pyelonephritis and LLL PNA. UA positive and pt reporting flank pain, CVA tenderness (+) on exam. LLL consolidation detected. COVID negative. S/p 3L NS + cef/azithro in the ED.   -clinically much improved, WBC 20 from 23   - C/w ceftriaxone and azithromycin  - F/u BCx and UCx   - F/u urine legionella: neg    - Tylenol PRN.  LLL pneumonia.   ·  Plan: Cough, fever. Respiratory status stable. Crackles diffusely on lung exam. CXR with LLL PNA.   - C/w ceftriaxone and azithromycin to treat for CAP.  -changed to Levaquin for a completion of 7 days     UTI (urinary tract infection).   ·  Plan: Likely pyelonephritis given presentation of nausea/vomiting, increased urinary frequency and back pain  as well as physical exam findings (CVA tenderness/suprapubic tenderness).   - C/w ceftriaxone for now   - F/u UCx, blood Cxs.    Hypokalemia.   ·  Plan: K 2.8. Likely 2/2 vomiting.   - replete PRN     #Hypophosphatemia  - replete PRN.    HTN (hypertension).   ·  Plan: Hx of HTN. Normotensive.   - C/w Norvasc.     HLD (hyperlipidemia).   ·  Plan: C/w atorvastatin.     GERD (gastroesophageal reflux disease).   ·  Plan: C/w Nexium - therapeutic interchange pantoprazole 40mg qd.    Dispo- On 12/2/21, patient medically optimized for discharge as per attending , Dr. Damon.  All medications reviewed prior to discharge.        62 y/o F with PMH HTN, HLD, and GERD who presents after having a fever, cough and back pain at home. She reports that her daughter was sick and she started to feel unwell after Thanksgiving. She was experiencing fever, cough, SOB, chest pain with coughing, nausea/vomiting (5 episodes), and one episode of watery diarrhea. She reports that she has been urinating more frequently and has some pain in her lower abdomen as well as mild back pain. No dysuria.     Hospital Course:    ·  Problem: Sepsis.   ·  Plan: T 102.4 with leukocytosis of 22 2/2 pyelonephritis and LLL PNA. UA positive and pt reporting flank pain, CVA tenderness (+) on exam. LLL consolidation detected. COVID negative. S/p 3L NS + cef/azithro in the ED.   -clinically much improved, WBC 20 from 23   - C/w ceftriaxone and azithromycin  - F/u BCx and UCx   - F/u urine legionella: neg    - Tylenol PRN.  LLL pneumonia.   ·  Plan: Cough, fever. Respiratory status stable. Crackles diffusely on lung exam. CXR with LLL PNA.   - C/w ceftriaxone and azithromycin to treat for CAP.  -changed to Levaquin for a completion of 7 days     UTI (urinary tract infection).   ·  Plan: Likely pyelonephritis given presentation of nausea/vomiting, increased urinary frequency and back pain  as well as physical exam findings (CVA tenderness/suprapubic tenderness).   - C/w ceftriaxone for now   - F/u UCx, blood Cx - negative   symptoms resolved, unlikely UTI, will dc on just Levaquin     Hypokalemia.   ·  Plan: K 2.8. Likely 2/2 vomiting.   - replete PRN     #Hypophosphatemia  - replete PRN.    HTN (hypertension).   ·  Plan: Hx of HTN. Normotensive.   - C/w Norvasc.     HLD (hyperlipidemia).   ·  Plan: C/w atorvastatin.     GERD (gastroesophageal reflux disease).   ·  Plan: C/w Nexium - therapeutic interchange pantoprazole 40mg qd.    Dispo- On 12/2/21, patient medically optimized for discharge as per attending , Dr. Damon.  All medications reviewed prior to discharge.

## 2021-12-02 NOTE — DISCHARGE NOTE PROVIDER - CARE PROVIDER_API CALL
Dewayne Navarro  Internal Medicine  [address] [place] [address]  Phone: ([phone]  Fax: ()-  Follow Up Time:

## 2021-12-02 NOTE — PROGRESS NOTE ADULT - NSPROGADDITIONALINFOA_GEN_ALL_CORE
Medically stable for DC home today on PO abx  D/w patient   will pick her up this PM  35 min spent on DC planning
F/u blood Cx and Ucx   Trend WBC   Monitor clinically, likely DC tomorrow on oral abx if continues to clinically improve  dw pt and  at bedside

## 2021-12-02 NOTE — PROGRESS NOTE ADULT - PROBLEM SELECTOR PLAN 2
Cough, fever. Respiratory status stable. Crackles diffusely on lung exam. CXR with LLL PNA.   - C/w ceftriaxone and azithromycin to treat for CAP
Cough, fever. Respiratory status stable. Crackles diffusely on lung exam. CXR with LLL PNA.   - DC home on Levaquin to complete 7 day course

## 2021-12-02 NOTE — PROGRESS NOTE ADULT - PROBLEM SELECTOR PLAN 1
T 102.4 with leukocytosis of 22 2/2 pyelonephritis and LLL PNA. UA positive and pt reporting flank pain, CVA tenderness (+) on exam. LLL consolidation detected. COVID negative. S/p 3L NS + cef/azithro in the ED.   -clinically much improved, WBC 20 from 23   - C/w ceftriaxone and azithromycin  - F/u BCx and UCx   - F/u urine legionella: neg    - Tylenol PRN
T 102.4 with leukocytosis of 22 2/2 pyelonephritis and LLL PNA. UA positive and pt reporting flank pain, CVA tenderness (+) on exam. LLL consolidation detected. COVID negative. S/p 3L NS + cef/azithro in the ED.   -clinically much improved, WBC down to 15 from 23 on admission   - blood cultures and UCx negative  - stable for DC home on PO abx regimen

## 2021-12-02 NOTE — PROGRESS NOTE ADULT - SUBJECTIVE AND OBJECTIVE BOX
Patient is a 61y old  Female who presents with a chief complaint of Fever (2021 10:26)        SUBJECTIVE / OVERNIGHT EVENTS:  no acute events o/n  pt sitting up in bed   at bedside  pt states she feels much better  SOB and L flank pain much improved  looking forward to discharge, hopefully tomorrow       MEDICATIONS  (STANDING):  albuterol/ipratropium for Nebulization 3 milliLiter(s) Nebulizer every 6 hours  amLODIPine   Tablet 10 milliGRAM(s) Oral daily  aspirin enteric coated 81 milliGRAM(s) Oral daily  atorvastatin 40 milliGRAM(s) Oral at bedtime  azithromycin  IVPB 500 milliGRAM(s) IV Intermittent every 24 hours  cefTRIAXone   IVPB 1000 milliGRAM(s) IV Intermittent every 24 hours  enoxaparin Injectable 40 milliGRAM(s) SubCutaneous every 24 hours  influenza   Vaccine 0.5 milliLiter(s) IntraMuscular once  pantoprazole    Tablet 40 milliGRAM(s) Oral before breakfast    MEDICATIONS  (PRN):  acetaminophen     Tablet .. 650 milliGRAM(s) Oral every 6 hours PRN Temp greater or equal to 38C (100.4F), Mild Pain (1 - 3)      Vital Signs Last 24 Hrs  T(C): 36.6 (01 Dec 2021 09:11), Max: 37.4 (2021 20:56)  T(F): 97.9 (01 Dec 2021 09:11), Max: 99.3 (2021 20:56)  HR: 80 (01 Dec 2021 09:19) (72 - 104)  BP: 137/47 (01 Dec 2021 09:11) (113/55 - 137/47)  BP(mean): --  RR: 18 (01 Dec 2021 09:11) (16 - 18)  SpO2: 98% (01 Dec 2021 09:19) (95% - 100%)  CAPILLARY BLOOD GLUCOSE        I&O's Summary    2021 07:01  -  01 Dec 2021 07:00  --------------------------------------------------------  IN: 0 mL / OUT: 0 mL / NET: 0 mL        PHYSICAL EXAM:    Gen: NAD   Respiratory: left sided scattered crackles   Cardiac: +S1/S2; RRR; no M/R/G  Gastrointestinal: abdomen soft, NT/ND; no rebound or guarding; +BSx4  Back: no CVA tenderness   Extremities: WWP, no clubbing or cyanosis; no peripheral edema    LABS:                        11.7   20.71 )-----------( 304      ( 01 Dec 2021 11:28 )             35.5         137  |  104  |  10  ----------------------------<  102<H>  3.1<L>   |  22  |  0.64    Ca    8.5      2021 23:17  Phos  1.6       Mg     2.10         TPro  7.7  /  Alb  4.1  /  TBili  1.8<H>  /  DBili  x   /  AST  17  /  ALT  12  /  AlkPhos  97            Urinalysis Basic - ( 2021 03:39 )    Color: Nannette / Appearance: Slightly Turbid / S.029 / pH: x  Gluc: x / Ketone: Small  / Bili: Small / Urobili: 6 mg/dL   Blood: x / Protein: 100 mg/dL / Nitrite: Negative   Leuk Esterase: Moderate / RBC: <5 /HPF / WBC >10 /HPF   Sq Epi: x / Non Sq Epi: 12 /HPF / Bacteria: Few        RADIOLOGY & ADDITIONAL TESTS:    Imaging Personally Reviewed:  Consultant(s) Notes Reviewed:    Care Discussed with Consultants/Other Providers:  
Patient is a 61y old  Female who presents with a chief complaint of Fever (02 Dec 2021 10:45)        SUBJECTIVE / OVERNIGHT EVENTS:  pt sitting in chair  denies complaints  no sob/cp/abd pain/flank pain   looking fwd to NM home     MEDICATIONS  (STANDING):  albuterol/ipratropium for Nebulization 3 milliLiter(s) Nebulizer every 6 hours  amLODIPine   Tablet 10 milliGRAM(s) Oral daily  aspirin enteric coated 81 milliGRAM(s) Oral daily  atorvastatin 40 milliGRAM(s) Oral at bedtime  azithromycin  IVPB 500 milliGRAM(s) IV Intermittent every 24 hours  cefTRIAXone   IVPB 1000 milliGRAM(s) IV Intermittent every 24 hours  enoxaparin Injectable 40 milliGRAM(s) SubCutaneous every 24 hours  influenza   Vaccine 0.5 milliLiter(s) IntraMuscular once  pantoprazole    Tablet 40 milliGRAM(s) Oral before breakfast    MEDICATIONS  (PRN):  acetaminophen     Tablet .. 650 milliGRAM(s) Oral every 6 hours PRN Temp greater or equal to 38C (100.4F), Mild Pain (1 - 3)      Vital Signs Last 24 Hrs  T(C): 37.1 (02 Dec 2021 05:40), Max: 37.1 (02 Dec 2021 05:40)  T(F): 98.8 (02 Dec 2021 05:40), Max: 98.8 (02 Dec 2021 05:40)  HR: 74 (02 Dec 2021 10:52) (74 - 94)  BP: 136/68 (02 Dec 2021 05:40) (114/60 - 136/68)  BP(mean): --  RR: 17 (02 Dec 2021 05:40) (17 - 18)  SpO2: 99% (02 Dec 2021 10:52) (95% - 99%)  CAPILLARY BLOOD GLUCOSE        I&O's Summary    01 Dec 2021 07:01  -  02 Dec 2021 07:00  --------------------------------------------------------  IN: 680 mL / OUT: 0 mL / NET: 680 mL      PHYSICAL EXAM:    Gen: NAD   Respiratory: L basilar crackles, improved   Cardiac: +S1/S2; RRR; no M/R/G  Gastrointestinal: abdomen soft, NT/ND; no rebound or guarding; +BSx4  Back: no CVA tenderness   Extremities: WWP, no clubbing or cyanosis; no peripheral edema    LABS:                        11.3   15.55 )-----------( 309      ( 02 Dec 2021 07:32 )             34.7     12-02    134<L>  |  99  |  8   ----------------------------<  138<H>  3.2<L>   |  24  |  0.60    Ca    8.6      02 Dec 2021 07:32  Phos  2.5     12-02  Mg     2.00     12-02                RADIOLOGY & ADDITIONAL TESTS:    Imaging Personally Reviewed:  Consultant(s) Notes Reviewed:    Care Discussed with Consultants/Other Providers:

## 2021-12-05 LAB
CULTURE RESULTS: SIGNIFICANT CHANGE UP
CULTURE RESULTS: SIGNIFICANT CHANGE UP
SPECIMEN SOURCE: SIGNIFICANT CHANGE UP
SPECIMEN SOURCE: SIGNIFICANT CHANGE UP

## 2022-07-18 PROBLEM — E78.5 HYPERLIPIDEMIA, UNSPECIFIED: Chronic | Status: ACTIVE | Noted: 2021-11-30

## 2022-07-18 PROBLEM — K21.9 GASTRO-ESOPHAGEAL REFLUX DISEASE WITHOUT ESOPHAGITIS: Chronic | Status: ACTIVE | Noted: 2021-11-30

## 2022-07-18 PROBLEM — I10 ESSENTIAL (PRIMARY) HYPERTENSION: Chronic | Status: ACTIVE | Noted: 2021-11-30

## 2022-07-26 PROBLEM — Z00.00 ENCOUNTER FOR PREVENTIVE HEALTH EXAMINATION: Status: ACTIVE | Noted: 2022-07-26

## 2022-07-28 ENCOUNTER — LABORATORY RESULT (OUTPATIENT)
Age: 62
End: 2022-07-28

## 2022-07-28 ENCOUNTER — APPOINTMENT (OUTPATIENT)
Dept: PULMONOLOGY | Facility: CLINIC | Age: 62
End: 2022-07-28

## 2022-07-28 ENCOUNTER — MED ADMIN CHARGE (OUTPATIENT)
Age: 62
End: 2022-07-28

## 2022-07-28 VITALS
BODY MASS INDEX: 41.33 KG/M2 | HEIGHT: 59 IN | HEART RATE: 90 BPM | SYSTOLIC BLOOD PRESSURE: 136 MMHG | TEMPERATURE: 98.2 F | RESPIRATION RATE: 16 BRPM | DIASTOLIC BLOOD PRESSURE: 77 MMHG | WEIGHT: 205 LBS

## 2022-07-28 DIAGNOSIS — R06.02 SHORTNESS OF BREATH: ICD-10-CM

## 2022-07-28 DIAGNOSIS — K80.20 CALCULUS OF GALLBLADDER W/OUT CHOLECYSTITIS W/OUT OBSTRUCTION: ICD-10-CM

## 2022-07-28 DIAGNOSIS — R60.0 LOCALIZED EDEMA: ICD-10-CM

## 2022-07-28 DIAGNOSIS — I10 ESSENTIAL (PRIMARY) HYPERTENSION: ICD-10-CM

## 2022-07-28 DIAGNOSIS — R93.89 ABNORMAL FINDINGS ON DIAGNOSTIC IMAGING OF OTHER SPECIFIED BODY STRUCTURES: ICD-10-CM

## 2022-07-28 DIAGNOSIS — Z87.01 PERSONAL HISTORY OF PNEUMONIA (RECURRENT): ICD-10-CM

## 2022-07-28 PROCEDURE — 96372 THER/PROPH/DIAG INJ SC/IM: CPT

## 2022-07-28 PROCEDURE — 36415 COLL VENOUS BLD VENIPUNCTURE: CPT

## 2022-07-28 PROCEDURE — 99205 OFFICE O/P NEW HI 60 MIN: CPT | Mod: 25

## 2022-07-28 PROCEDURE — ZZZZZ: CPT

## 2022-07-28 PROCEDURE — 94618 PULMONARY STRESS TESTING: CPT

## 2022-07-28 RX ORDER — METHYLPREDNISOLONE 40 MG/ML
40 INJECTION, POWDER, LYOPHILIZED, FOR SOLUTION INTRAMUSCULAR; INTRAVENOUS
Qty: 1 | Refills: 0 | Status: COMPLETED | OUTPATIENT
Start: 2022-07-28

## 2022-07-28 RX ORDER — AMLODIPINE BESYLATE 10 MG/1
10 TABLET ORAL
Qty: 30 | Refills: 0 | Status: DISCONTINUED | COMMUNITY
Start: 2022-04-13

## 2022-07-28 RX ORDER — AMLODIPINE BESYLATE 10 MG/1
10 TABLET ORAL
Refills: 0 | Status: ACTIVE | COMMUNITY

## 2022-07-28 RX ORDER — ATORVASTATIN CALCIUM 80 MG/1
TABLET, FILM COATED ORAL
Refills: 0 | Status: ACTIVE | COMMUNITY

## 2022-07-28 RX ADMIN — METHYLPREDNISOLONE SODIUM SUCCINATE 20 MG: 40 INJECTION, POWDER, FOR SOLUTION INTRAMUSCULAR; INTRAVENOUS at 00:00

## 2022-07-28 NOTE — DISCUSSION/SUMMARY
[FreeTextEntry1] : ---Assessment plan----------The patient has been referred here for further opinion regarding pulmonary problem,\par 62 FEMALE=-== complaining of dyspnea on exertion---ILD --- has one dog at home---\par \par 1 ILD--- needs HRCT scan of the chest--low-dose steroids (taper instructions given to pt) -s/p medrol 20mg in office with good response-needs home oxygen as she destaurtes on exercise \par 2) CHF DUE TO Aortic valve disease ----need to keep her euvolemic-start lasix 20mg 3x weekly echo and cardiac cath pending- \par 3) needs PFT and 6mwt\par 4) has features of ZORAIDA --HST\par 5) pt desat on walking- it is medically necessary for her to use oxygen\par 6)  labs drawn in our office today \par \par Follow-up in 6 to 8 weeks\par \par Thanks for allowing  me to participate  in the care of this patient.  Patient at this time  will follow  the above mentioned recommendations and return back for follow up visit. If you have any questions  I can be reached  at # [phone] (office).\par \par Cassie Hinkle MD, FCCP \par Director, Pulmonary Hypertension Program \par [facility] \par Division of Pulmonary, Critical Care and Sleep Medicine \par  Professor of Medicine \par [employer]\par \par ADRIAN MujicaC\par

## 2022-07-28 NOTE — HISTORY OF PRESENT ILLNESS
[TextBox_4] : This letter  is regarding your patient  who  attended pulmonary out patient office today.  I have reviewed  patient's  past history, social history, family history and medication list. I also  reviewed nurse practitioners/ and fellows  notes and assessment and agree with it.  \par The patient was referred by \par 62 FEMALE=-== complaining of dyspnea on exertion------ for last few years\par ------No history of , fever, chills , rigors, chest pain, or hemoptysis. Questionable history of Raynaud's phenomenon. No h/o significant weight loss in last few months. No history of liver dysfunction , collagen vascular disorder or chronic thromboembolic disease. I would classify the patient's dyspnea as WHO  FUNCTIONAL CLASS II--------\par \par --Non-smoker has 1 dog at home-\par \par \par ----Echo  date------ with PMD she appears to have aortic stenosis---needs follow-up echo report from her cardiologist\par ----Pft date--------- pending\par 6MWT 2022----evidence of desaturation\par ----Ct scan date------- pending\par CXR 11/2021  ead as   LLL pneumonia but MY READ IS SHE HAS INTERSTIAL LUNG DISEASE \par \par \par

## 2022-07-28 NOTE — REVIEW OF SYSTEMS
[Cough] : cough [Chest Discomfort] : chest discomfort [Anemia] : anemia [Fever] : no fever [Dry Eyes] : no dry eyes [Hay Fever] : no hay fever [Nocturia] : no nocturia [Arthralgias] : no arthralgias [Raynaud] : no raynaud [Headache] : no headache [Depression] : no depression [Diabetes] : no diabetes

## 2022-07-28 NOTE — REASON FOR VISIT
[Initial] : an initial visit [Shortness of Breath] : shortness of breath [Abnormal CXR/ Chest CT] : an abnormal CXR/ chest CT [ILD] : ILD

## 2022-07-28 NOTE — PHYSICAL EXAM
[No Acute Distress] : no acute distress [Normal Oropharynx] : normal oropharynx [IV] : Mallampati Class: IV [No Neck Mass] : no neck mass [Normal S1, S2] : normal s1, s2 [Clear to Auscultation Bilaterally] : clear to auscultation bilaterally [No Abnormalities] : no abnormalities [Benign] : benign [Normal Gait] : normal gait [No Clubbing] : no clubbing [Normal Color/ Pigmentation] : normal color/ pigmentation [No Focal Deficits] : no focal deficits [Oriented x3] : oriented x3 [1+ Pitting] : 1+ pitting [TextBox_54] : ESM at aortic area

## 2022-07-31 ENCOUNTER — NON-APPOINTMENT (OUTPATIENT)
Age: 62
End: 2022-07-31

## 2022-08-01 ENCOUNTER — NON-APPOINTMENT (OUTPATIENT)
Age: 62
End: 2022-08-01

## 2022-08-01 DIAGNOSIS — E55.9 VITAMIN D DEFICIENCY, UNSPECIFIED: ICD-10-CM

## 2022-08-01 LAB
25(OH)D3 SERPL-MCNC: 13.4 NG/ML
A1AT SERPL-MCNC: 165 MG/DL
ALBUMIN SERPL ELPH-MCNC: 4.5 G/DL
ALP BLD-CCNC: 133 U/L
ALT SERPL-CCNC: 18 U/L
ANION GAP SERPL CALC-SCNC: 16 MMOL/L
APTT BLD: 31.2 SEC
AST SERPL-CCNC: 22 U/L
BASOPHILS # BLD AUTO: 0.09 K/UL
BASOPHILS NFR BLD AUTO: 0.6 %
BILIRUB SERPL-MCNC: 0.4 MG/DL
BUN SERPL-MCNC: 18 MG/DL
CALCIUM SERPL-MCNC: 9.5 MG/DL
CALCIUM SERPL-MCNC: 9.5 MG/DL
CARDIOLIPIN AB SER IA-ACNC: NEGATIVE
CENTROMERE IGG SER-ACNC: >8 CD:130001892
CHLORIDE SERPL-SCNC: 104 MMOL/L
CK SERPL-CCNC: 162 U/L
CO2 SERPL-SCNC: 23 MMOL/L
COVID-19 NUCLEOCAPSID  GAM ANTIBODY INTERPRETATION: NEGATIVE
COVID-19 SPIKE DOMAIN ANTIBODY INTERPRETATION: POSITIVE
CREAT SERPL-MCNC: 0.81 MG/DL
CRP SERPL-MCNC: <3 MG/L
DEPRECATED KAPPA LC FREE/LAMBDA SER: 1.31 RATIO
EGFR: 83 ML/MIN/1.73M2
ENA JO1 AB SER IA-ACNC: <0.2 AL
ENA SCL70 IGG SER IA-ACNC: <0.2 AL
ENA SS-A AB SER IA-ACNC: <0.2 AL
ENA SS-B AB SER IA-ACNC: <0.2 AL
EOSINOPHIL # BLD AUTO: 0.65 K/UL
EOSINOPHIL # BLD MANUAL: 650 /UL
EOSINOPHIL NFR BLD AUTO: 4.7 %
ERYTHROCYTE [SEDIMENTATION RATE] IN BLOOD BY WESTERGREN METHOD: 11 MM/HR
FOLATE RBC-MCNC: 665 NG/ML
GLUCOSE SERPL-MCNC: 92 MG/DL
HCT VFR BLD CALC: 45.1 %
HCT VFR BLD CALC: 46.4 %
HCYS SERPL-MCNC: 13.7 UMOL/L
HGB BLD-MCNC: 12.9 G/DL
IGA SER QL IEP: 187 MG/DL
IGG SER QL IEP: 1103 MG/DL
IGM SER QL IEP: 167 MG/DL
IMM GRANULOCYTES NFR BLD AUTO: 0.4 %
INR PPP: 1.03 RATIO
KAPPA LC CSF-MCNC: 1.77 MG/DL
KAPPA LC SERPL-MCNC: 2.31 MG/DL
LYMPHOCYTES # BLD AUTO: 1.83 K/UL
LYMPHOCYTES NFR BLD AUTO: 13.1 %
MAN DIFF?: NORMAL
MCHC RBC-ENTMCNC: 26.8 PG
MCHC RBC-ENTMCNC: 27.8 GM/DL
MCV RBC AUTO: 96.5 FL
MONOCYTES # BLD AUTO: 0.69 K/UL
MONOCYTES NFR BLD AUTO: 4.9 %
MYELOPEROXIDASE AB SER QL IA: 49.9 UNITS
MYELOPEROXIDASE CELLS FLD QL: POSITIVE
NEUTROPHILS # BLD AUTO: 10.64 K/UL
NEUTROPHILS NFR BLD AUTO: 76.3 %
NT-PROBNP SERPL-MCNC: 78 PG/ML
PARATHYROID HORMONE INTACT: 48 PG/ML
PHOSPHATE SERPL-MCNC: 3.3 MG/DL
PLATELET # BLD AUTO: 292 K/UL
POTASSIUM SERPL-SCNC: 4.2 MMOL/L
PROT SERPL-MCNC: 7.5 G/DL
PT BLD: 12 SEC
RBC # BLD: 4.81 M/UL
RBC # FLD: 15.7 %
RHEUMATOID FACT SER QL: 160 IU/ML
RIBOSOMAL P AB SER IA-ACNC: <0.2 AL
RNA POLYMERASE III IGG: 7 UNITS
SARS-COV-2 AB SERPL IA-ACNC: >250 U/ML
SARS-COV-2 AB SERPL QL IA: 0.07 INDEX
SODIUM SERPL-SCNC: 143 MMOL/L
T3FREE SERPL-MCNC: 3.24 PG/ML
T4 FREE SERPL-MCNC: 1.1 NG/DL
TOTAL IGE SMQN RAST: 29 KU/L
TSH SERPL-ACNC: 3.49 UIU/ML
URATE SERPL-MCNC: 4.8 MG/DL
VIT B12 SERPL-MCNC: 315 PG/ML
WBC # FLD AUTO: 13.95 K/UL

## 2022-08-02 LAB
ANCA AB SER-IMP: NEGATIVE
C-ANCA SER-ACNC: NEGATIVE
CCP AB SER IA-ACNC: <8 UNITS
M TB IFN-G BLD-IMP: ABNORMAL
O2 CT VFR BLD CALC: ABNORMAL
P-ANCA TITR SER IF: POSITIVE
QUANTIFERON TB PLUS MITOGEN MINUS NIL: 0.17 IU/ML
QUANTIFERON TB PLUS NIL: 0.04 IU/ML
QUANTIFERON TB PLUS TB1 MINUS NIL: 0 IU/ML
QUANTIFERON TB PLUS TB2 MINUS NIL: 0 IU/ML
RF+CCP IGG SER-IMP: NEGATIVE
STREP DNASE B TITR SER: 429 U/ML

## 2022-08-03 ENCOUNTER — NON-APPOINTMENT (OUTPATIENT)
Age: 62
End: 2022-08-03

## 2022-08-03 LAB

## 2022-08-04 ENCOUNTER — NON-APPOINTMENT (OUTPATIENT)
Age: 62
End: 2022-08-04

## 2022-08-04 LAB
ANA PAT FLD IF-IMP: ABNORMAL
ANA PAT FLD IF-IMP: ABNORMAL
ANA PATTERN: ABNORMAL
ANA SER IF-ACNC: ABNORMAL
ANA TITER: ABNORMAL
ANACR T: ABNORMAL
DSDNA AB SER-ACNC: 28 IU/ML
SMOOTH MUSCLE AB SER QL IF: NORMAL

## 2022-08-09 LAB — T4 FREE SERPL DIALY-MCNC: 1.3 NG/DL

## 2022-09-14 ENCOUNTER — APPOINTMENT (OUTPATIENT)
Dept: SLEEP CENTER | Facility: CLINIC | Age: 62
End: 2022-09-14

## 2022-10-03 ENCOUNTER — APPOINTMENT (OUTPATIENT)
Dept: PULMONOLOGY | Facility: CLINIC | Age: 62
End: 2022-10-03

## 2022-11-26 ENCOUNTER — OUTPATIENT (OUTPATIENT)
Dept: OUTPATIENT SERVICES | Facility: HOSPITAL | Age: 62
LOS: 1 days | End: 2022-11-26
Payer: MEDICAID

## 2022-11-26 ENCOUNTER — APPOINTMENT (OUTPATIENT)
Dept: SLEEP CENTER | Facility: CLINIC | Age: 62
End: 2022-11-26

## 2022-11-26 PROCEDURE — 95810 POLYSOM 6/> YRS 4/> PARAM: CPT | Mod: 26

## 2022-11-26 PROCEDURE — 95810 POLYSOM 6/> YRS 4/> PARAM: CPT

## 2022-11-30 ENCOUNTER — NON-APPOINTMENT (OUTPATIENT)
Age: 62
End: 2022-11-30

## 2022-11-30 DIAGNOSIS — G47.33 OBSTRUCTIVE SLEEP APNEA (ADULT) (PEDIATRIC): ICD-10-CM

## 2022-11-30 RX ORDER — ERGOCALCIFEROL 1.25 MG/1
1.25 MG CAPSULE ORAL
Qty: 4 | Refills: 0 | Status: DISCONTINUED | COMMUNITY
Start: 2022-08-01 | End: 2022-11-30

## 2022-11-30 RX ORDER — MULTIVIT-MIN/FOLIC/VIT K/LYCOP 400-300MCG
25 MCG TABLET ORAL DAILY
Qty: 90 | Refills: 2 | Status: ACTIVE | COMMUNITY
Start: 2022-11-30 | End: 1900-01-01

## 2022-12-16 ENCOUNTER — APPOINTMENT (OUTPATIENT)
Dept: PULMONOLOGY | Facility: CLINIC | Age: 62
End: 2022-12-16

## 2022-12-16 ENCOUNTER — LABORATORY RESULT (OUTPATIENT)
Age: 62
End: 2022-12-16

## 2022-12-16 ENCOUNTER — NON-APPOINTMENT (OUTPATIENT)
Age: 62
End: 2022-12-16

## 2022-12-16 VITALS
WEIGHT: 205 LBS | DIASTOLIC BLOOD PRESSURE: 77 MMHG | BODY MASS INDEX: 41.33 KG/M2 | SYSTOLIC BLOOD PRESSURE: 136 MMHG | HEART RATE: 93 BPM | TEMPERATURE: 98.2 F | RESPIRATION RATE: 17 BRPM | HEIGHT: 59 IN

## 2022-12-16 VITALS
HEART RATE: 93 BPM | SYSTOLIC BLOOD PRESSURE: 148 MMHG | WEIGHT: 207 LBS | BODY MASS INDEX: 41.73 KG/M2 | TEMPERATURE: 97.7 F | HEIGHT: 59 IN | DIASTOLIC BLOOD PRESSURE: 78 MMHG | OXYGEN SATURATION: 95 %

## 2022-12-16 DIAGNOSIS — E66.3 OVERWEIGHT: ICD-10-CM

## 2022-12-16 DIAGNOSIS — R01.1 CARDIAC MURMUR, UNSPECIFIED: ICD-10-CM

## 2022-12-16 DIAGNOSIS — E66.9 OVERWEIGHT: ICD-10-CM

## 2022-12-16 DIAGNOSIS — J84.9 INTERSTITIAL PULMONARY DISEASE, UNSPECIFIED: ICD-10-CM

## 2022-12-16 DIAGNOSIS — R05.9 COUGH, UNSPECIFIED: ICD-10-CM

## 2022-12-16 PROCEDURE — 94729 DIFFUSING CAPACITY: CPT

## 2022-12-16 PROCEDURE — ZZZZZ: CPT

## 2022-12-16 PROCEDURE — 94726 PLETHYSMOGRAPHY LUNG VOLUMES: CPT

## 2022-12-16 PROCEDURE — 99215 OFFICE O/P EST HI 40 MIN: CPT | Mod: 25

## 2022-12-16 PROCEDURE — 94010 BREATHING CAPACITY TEST: CPT

## 2022-12-16 RX ORDER — FUROSEMIDE 20 MG/1
20 TABLET ORAL
Qty: 30 | Refills: 3 | Status: ACTIVE | COMMUNITY
Start: 2022-07-28 | End: 1900-01-01

## 2022-12-16 NOTE — REVIEW OF SYSTEMS
[Fever] : no fever [Dry Eyes] : no dry eyes [Hay Fever] : no hay fever [Nocturia] : no nocturia [Arthralgias] : no arthralgias [Raynaud] : no raynaud [Headache] : no headache [Depression] : no depression [Diabetes] : no diabetes

## 2022-12-16 NOTE — HISTORY OF PRESENT ILLNESS
[Never] : never [Snoring] : snoring [TextBox_4] : This letter  is regarding your patient  who  attended pulmonary out patient office today.  I have reviewed  patient's  past history, social history, family history and medication list. I also  reviewed nurse practitioners/ and fellows  notes and assessment and agree with it.  \par The patient was referred by \par 62 FEMALE=-== complaining of dyspnea on exertion------ for last few years\par ------No history of , fever, chills , rigors, chest pain, or hemoptysis. Questionable history of Raynaud's phenomenon. No h/o significant weight loss in last few months. No history of liver dysfunction , collagen vascular disorder or chronic thromboembolic disease. I would classify the patient's dyspnea as WHO  FUNCTIONAL CLASS II--------\par \par --Non-smoker has 1 dog at home-\par \par \par ----Echo  date------ with PMD she appears to have aortic stenosis---needs follow-up echo report from her cardiologist\par ----Pft date---------12/2022 normal lung volumes, decreased dlco\par 6MWT 2022----evidence of desaturation\par ----Ct scan date------- pending\par CXR 11/2021  ead as   LLL pneumonia but MY READ IS SHE HAS INTERSTITIAL LUNG DISEASE \par \par \par 12/2022 cough worse- stopped pred one month ago, uses nebs only once daily--awaiting     CT CHEST AND ECHO - -  [TextBox_100] : 11/26/22 [TextBox_108] : 17.5 [ESS] : 7

## 2022-12-16 NOTE — DISCUSSION/SUMMARY
[FreeTextEntry1] : ---Assessment plan----------The patient has been referred here for further opinion regarding pulmonary problem,\par 62 FEMALE=-== complaining of dyspnea on exertion---ILD --- has one dog at home---\par \par 1 ILD--- needs HRCT scan of the chest--low-dose steroids  7.5mg daily, nebs 3-4x daily \par 2) CHF DUE TO Aortic valve disease ----need to keep her euvolemic-continue lasix 20mg 3x weekly -needs echo \par 3) needs dexa scan\par 4)  ZORAIDA -on -HST-needs cpap study\par 5) pt desat on walking- it is medically necessary for her to use oxygen\par 6)  labs drawn in our office today \par 7) f/u in jan 2023\par \par Thanks for allowing  me to participate  in the care of this patient.  Patient at this time  will follow  the above mentioned recommendations and return back for follow up visit. If you have any questions  I can be reached  at # [phone] (office).\par \par Cassie Hinkle MD, FCCP \par Director, Pulmonary Hypertension Program \par [facility] \par Division of Pulmonary, Critical Care and Sleep Medicine \par  Professor of Medicine \par [employer]\par \par Em Alarcon ANP-C\par

## 2022-12-17 ENCOUNTER — NON-APPOINTMENT (OUTPATIENT)
Age: 62
End: 2022-12-17

## 2022-12-19 LAB
ALBUMIN SERPL ELPH-MCNC: 4.8 G/DL
ALP BLD-CCNC: 147 U/L
ALT SERPL-CCNC: 44 U/L
ANION GAP SERPL CALC-SCNC: 11 MMOL/L
AST SERPL-CCNC: 35 U/L
BASOPHILS # BLD AUTO: 0.09 K/UL
BASOPHILS NFR BLD AUTO: 0.5 %
BILIRUB SERPL-MCNC: 0.6 MG/DL
BUN SERPL-MCNC: 17 MG/DL
CALCIUM SERPL-MCNC: 9.6 MG/DL
CHLORIDE SERPL-SCNC: 102 MMOL/L
CO2 SERPL-SCNC: 28 MMOL/L
CREAT SERPL-MCNC: 0.88 MG/DL
DEPRECATED KAPPA LC FREE/LAMBDA SER: 1.41 RATIO
EGFR: 75 ML/MIN/1.73M2
EOSINOPHIL # BLD AUTO: 0.07 K/UL
EOSINOPHIL # BLD MANUAL: 40 /UL
EOSINOPHIL NFR BLD AUTO: 0.4 %
ESTIMATED AVERAGE GLUCOSE: 123 MG/DL
GLUCOSE SERPL-MCNC: 126 MG/DL
HBA1C MFR BLD HPLC: 5.9 %
HCT VFR BLD CALC: 43.3 %
HGB BLD-MCNC: 13.2 G/DL
IGA SER QL IEP: 160 MG/DL
IGG SER QL IEP: 770 MG/DL
IGM SER QL IEP: 161 MG/DL
IMM GRANULOCYTES NFR BLD AUTO: 0.5 %
KAPPA LC CSF-MCNC: 1.16 MG/DL
KAPPA LC SERPL-MCNC: 1.63 MG/DL
LYMPHOCYTES # BLD AUTO: 1.03 K/UL
LYMPHOCYTES NFR BLD AUTO: 5.3 %
MAN DIFF?: NORMAL
MCHC RBC-ENTMCNC: 27.3 PG
MCHC RBC-ENTMCNC: 30.5 GM/DL
MCV RBC AUTO: 89.6 FL
MONOCYTES # BLD AUTO: 0.4 K/UL
MONOCYTES NFR BLD AUTO: 2.1 %
NEUTROPHILS # BLD AUTO: 17.62 K/UL
NEUTROPHILS NFR BLD AUTO: 91.2 %
PLATELET # BLD AUTO: 345 K/UL
POTASSIUM SERPL-SCNC: 4.3 MMOL/L
PROT SERPL-MCNC: 7.3 G/DL
RBC # BLD: 4.83 M/UL
RBC # FLD: 15.7 %
SODIUM SERPL-SCNC: 141 MMOL/L
WBC # FLD AUTO: 19.31 K/UL

## 2022-12-20 LAB — TOTAL IGE SMQN RAST: 16 KU/L

## 2022-12-21 ENCOUNTER — NON-APPOINTMENT (OUTPATIENT)
Age: 62
End: 2022-12-21

## 2022-12-21 DIAGNOSIS — D72.829 ELEVATED WHITE BLOOD CELL COUNT, UNSPECIFIED: ICD-10-CM

## 2022-12-21 LAB

## 2023-01-05 ENCOUNTER — APPOINTMENT (OUTPATIENT)
Dept: CV DIAGNOSITCS | Facility: HOSPITAL | Age: 63
End: 2023-01-05

## 2023-01-06 DIAGNOSIS — G47.33 OBSTRUCTIVE SLEEP APNEA (ADULT) (PEDIATRIC): ICD-10-CM

## 2023-02-01 NOTE — PROGRESS NOTE ADULT - PROBLEM/PLAN-4
Record request sent. Once received I will scan it into pt's chart  
DISPLAY PLAN FREE TEXT
DISPLAY PLAN FREE TEXT

## 2023-02-13 ENCOUNTER — APPOINTMENT (OUTPATIENT)
Dept: CV DIAGNOSITCS | Facility: HOSPITAL | Age: 63
End: 2023-02-13
Payer: MEDICAID

## 2023-02-13 ENCOUNTER — OUTPATIENT (OUTPATIENT)
Dept: OUTPATIENT SERVICES | Facility: HOSPITAL | Age: 63
LOS: 1 days | End: 2023-02-13

## 2023-02-13 DIAGNOSIS — J84.9 INTERSTITIAL PULMONARY DISEASE, UNSPECIFIED: ICD-10-CM

## 2023-02-13 PROCEDURE — 93306 TTE W/DOPPLER COMPLETE: CPT | Mod: 26

## 2023-03-01 RX ORDER — PREDNISONE 2.5 MG/1
2.5 TABLET ORAL
Qty: 90 | Refills: 1 | Status: ACTIVE | COMMUNITY
Start: 2022-07-28 | End: 1900-01-01

## 2023-03-11 ENCOUNTER — APPOINTMENT (OUTPATIENT)
Dept: SLEEP CENTER | Facility: CLINIC | Age: 63
End: 2023-03-11

## 2023-03-23 ENCOUNTER — APPOINTMENT (OUTPATIENT)
Dept: PULMONOLOGY | Facility: CLINIC | Age: 63
End: 2023-03-23

## 2023-04-03 RX ORDER — MONTELUKAST 10 MG/1
10 TABLET, FILM COATED ORAL
Qty: 1 | Refills: 2 | Status: ACTIVE | COMMUNITY
Start: 2022-12-16 | End: 1900-01-01

## 2023-08-29 NOTE — DISCUSSION/SUMMARY
[FreeTextEntry1] : ---Assessment plan----------The patient has been referred here for further opinion regarding pulmonary problem, 62 FEMALE=-== complaining of dyspnea on exertion---ILD --- has one dog at home---  1 ILD--- needs HRCT scan of the chest--low-dose steroids  7.5mg daily, nebs 3-4x daily  2) CHF DUE TO Aortic valve disease ----need to keep her euvolemic-continue lasix 20mg 3x weekly -needs echo  3) needs dexa scan 4)  ZORAIDA -on -HST-needs cpap study 5) pt desat on walking- it is medically necessary for her to use oxygen 6)  labs drawn in our office today  7) f/u in jan 2023  Thanks for allowing  me to participate  in the care of this patient.  Patient at this time  will follow  the above mentioned recommendations and return back for follow up visit. If you have any questions  I can be reached  at # [phone] (office).  Cassie Hinkle MD, [facility]

## 2023-08-29 NOTE — REVIEW OF SYSTEMS
[Fever] : no fever [Dry Eyes] : no dry eyes [Cough] : cough [Chest Discomfort] : chest discomfort [Hay Fever] : no hay fever [Nocturia] : no nocturia [Arthralgias] : no arthralgias [Raynaud] : no raynaud [Anemia] : anemia [Headache] : no headache [Depression] : no depression [Diabetes] : no diabetes

## 2023-08-29 NOTE — PHYSICAL EXAM
[No Acute Distress] : no acute distress [Normal Oropharynx] : normal oropharynx [IV] : Mallampati Class: IV [No Neck Mass] : no neck mass [Normal S1, S2] : normal s1, s2 [Clear to Auscultation Bilaterally] : clear to auscultation bilaterally [No Abnormalities] : no abnormalities [Benign] : benign [Normal Gait] : normal gait [No Clubbing] : no clubbing [1+ Pitting] : 1+ pitting [Normal Color/ Pigmentation] : normal color/ pigmentation [No Focal Deficits] : no focal deficits [Oriented x3] : oriented x3 [TextBox_54] : ESM at aortic area

## 2023-08-29 NOTE — HISTORY OF PRESENT ILLNESS
[TextBox_4] : This letter  is regarding your patient  who  attended pulmonary out patient office today.  I have reviewed  patient's  past history, social history, family history and medication list. I also  reviewed nurse practitioners/ and fellows  notes and assessment and agree with it.  \par  The patient was referred by \par  62 FEMALE=-== complaining of dyspnea on exertion------ for last few years\par  ------No history of , fever, chills , rigors, chest pain, or hemoptysis. Questionable history of Raynaud's phenomenon. No h/o significant weight loss in last few months. No history of liver dysfunction , collagen vascular disorder or chronic thromboembolic disease. I would classify the patient's dyspnea as WHO  FUNCTIONAL CLASS II--------\par  \par  --Non-smoker has 1 dog at home-\par  \par  \par  ----Echo  date------ with PMD she appears to have aortic stenosis---needs follow-up echo report from her cardiologist\par  ----Pft date---------12/2022 normal lung volumes, decreased dlco\par  6MWT 2022----evidence of desaturation\par  ----Ct scan date------- pending\par  CXR 11/2021  ead as   LLL pneumonia but MY READ IS SHE HAS INTERSTITIAL LUNG DISEASE \par  \par  \par  12/2022 cough worse- stopped pred one month ago, uses nebs only once daily--awaiting     CT CHEST AND ECHO - -  [Snoring] : snoring [TextBox_100] : 11/26/22 [TextBox_108] : 17.5 [ESS] : 7

## 2023-09-05 ENCOUNTER — APPOINTMENT (OUTPATIENT)
Dept: PULMONOLOGY | Facility: CLINIC | Age: 63
End: 2023-09-05

## 2024-05-17 ENCOUNTER — APPOINTMENT (OUTPATIENT)
Dept: MRI IMAGING | Facility: CLINIC | Age: 64
End: 2024-05-17
Payer: MEDICAID

## 2024-05-17 PROCEDURE — 74181 MRI ABDOMEN W/O CONTRAST: CPT

## 2024-06-06 ENCOUNTER — APPOINTMENT (OUTPATIENT)
Dept: SURGERY | Facility: CLINIC | Age: 64
End: 2024-06-06

## 2024-10-29 ENCOUNTER — APPOINTMENT (OUTPATIENT)
Dept: SURGERY | Facility: CLINIC | Age: 64
End: 2024-10-29

## 2025-02-13 ENCOUNTER — APPOINTMENT (OUTPATIENT)
Dept: SURGERY | Facility: CLINIC | Age: 65
End: 2025-02-13